# Patient Record
Sex: FEMALE | Race: BLACK OR AFRICAN AMERICAN
[De-identification: names, ages, dates, MRNs, and addresses within clinical notes are randomized per-mention and may not be internally consistent; named-entity substitution may affect disease eponyms.]

---

## 2020-08-13 ENCOUNTER — HOSPITAL ENCOUNTER (INPATIENT)
Dept: HOSPITAL 35 - ER | Age: 65
LOS: 5 days | Discharge: HOME | DRG: 871 | End: 2020-08-18
Attending: INTERNAL MEDICINE | Admitting: INTERNAL MEDICINE
Payer: COMMERCIAL

## 2020-08-13 VITALS — DIASTOLIC BLOOD PRESSURE: 122 MMHG | SYSTOLIC BLOOD PRESSURE: 236 MMHG

## 2020-08-13 VITALS — BODY MASS INDEX: 27.27 KG/M2 | HEIGHT: 65.98 IN | WEIGHT: 169.7 LBS

## 2020-08-13 DIAGNOSIS — E11.10: ICD-10-CM

## 2020-08-13 DIAGNOSIS — Z79.899: ICD-10-CM

## 2020-08-13 DIAGNOSIS — Z20.828: ICD-10-CM

## 2020-08-13 DIAGNOSIS — E83.42: ICD-10-CM

## 2020-08-13 DIAGNOSIS — E78.00: ICD-10-CM

## 2020-08-13 DIAGNOSIS — A41.9: Primary | ICD-10-CM

## 2020-08-13 DIAGNOSIS — N17.9: ICD-10-CM

## 2020-08-13 DIAGNOSIS — E87.0: ICD-10-CM

## 2020-08-13 DIAGNOSIS — E87.6: ICD-10-CM

## 2020-08-13 DIAGNOSIS — R65.20: ICD-10-CM

## 2020-08-13 DIAGNOSIS — G93.41: ICD-10-CM

## 2020-08-13 DIAGNOSIS — E78.5: ICD-10-CM

## 2020-08-13 DIAGNOSIS — I16.0: ICD-10-CM

## 2020-08-13 LAB
ALBUMIN SERPL-MCNC: 3.3 G/DL (ref 3.4–5)
ALBUMIN SERPL-MCNC: 3.9 G/DL (ref 3.4–5)
ALT SERPL-CCNC: 30 U/L (ref 30–65)
ANION GAP SERPL CALC-SCNC: 13 MMOL/L (ref 7–16)
ANION GAP SERPL CALC-SCNC: 24 MMOL/L (ref 7–16)
AST SERPL-CCNC: 28 U/L (ref 15–37)
BACTERIA-REFLEX: (no result) /HPF
BASOPHILS NFR BLD AUTO: 0.5 % (ref 0–2)
BASOPHILS NFR BLD AUTO: 0.6 % (ref 0–2)
BE(VIVO): -9 MMOL/L
BILIRUB SERPL-MCNC: 0.7 MG/DL (ref 0.2–1)
BILIRUB UR-MCNC: NEGATIVE MG/DL
BUN SERPL-MCNC: 29 MG/DL (ref 7–18)
BUN SERPL-MCNC: 35 MG/DL (ref 7–18)
CALCIUM SERPL-MCNC: 8.2 MG/DL (ref 8.5–10.1)
CALCIUM SERPL-MCNC: 8.9 MG/DL (ref 8.5–10.1)
CHLORIDE SERPL-SCNC: 103 MMOL/L (ref 98–107)
CHLORIDE SERPL-SCNC: 111 MMOL/L (ref 98–107)
CHOLEST SERPL-MCNC: 277 MG/DL (ref ?–200)
CLARITY CSF: CLEAR
CO2 SERPL-SCNC: 15 MMOL/L (ref 21–32)
CO2 SERPL-SCNC: 24 MMOL/L (ref 21–32)
COLOR CSF: COLORLESS
COLOR UR: YELLOW
CREAT SERPL-MCNC: 1.9 MG/DL (ref 0.6–1)
CREAT SERPL-MCNC: 2.1 MG/DL (ref 0.6–1)
CSF RBC: 233 /MM3
CSF WBC: 5 /MM3 (ref 0–10)
EOSINOPHIL NFR BLD: 0 % (ref 0–3)
EOSINOPHIL NFR BLD: 0 % (ref 0–3)
ERYTHROCYTE [DISTWIDTH] IN BLOOD BY AUTOMATED COUNT: 14 % (ref 10.5–14.5)
ERYTHROCYTE [DISTWIDTH] IN BLOOD BY AUTOMATED COUNT: 14.2 % (ref 10.5–14.5)
GAS PNL BLDV: 44.4 MMHG (ref 35–45)
GLUCOSE CSF-MCNC: 299 MG/DL (ref 40–70)
GLUCOSE SERPL-MCNC: 258 MG/DL (ref 74–106)
GLUCOSE SERPL-MCNC: 504 MG/DL (ref 74–106)
GRANULOCYTES NFR BLD MANUAL: 86 % (ref 36–66)
GRANULOCYTES NFR BLD MANUAL: 88.3 % (ref 36–66)
HCO3 BLD-SCNC: 15.3 MMOL/L (ref 22–26)
HCT VFR BLD CALC: 39.7 % (ref 37–47)
HCT VFR BLD CALC: 42.8 % (ref 37–47)
HDLC SERPL-MCNC: 43 MG/DL (ref 40–?)
HGB BLD-MCNC: 13.5 GM/DL (ref 12–15)
HGB BLD-MCNC: 14.3 GM/DL (ref 12–15)
KETONES UR STRIP-MCNC: (no result) MG/DL
LDLC SERPL-MCNC: 208 MG/DL (ref ?–100)
LYMPHOCYTES NFR BLD AUTO: 10.1 % (ref 24–44)
LYMPHOCYTES NFR BLD AUTO: 8.1 % (ref 24–44)
MAGNESIUM SERPL-MCNC: 1.6 MG/DL (ref 1.8–2.4)
MAGNESIUM SERPL-MCNC: 1.8 MG/DL (ref 1.8–2.4)
MCH RBC QN AUTO: 29.4 PG (ref 26–34)
MCH RBC QN AUTO: 29.6 PG (ref 26–34)
MCHC RBC AUTO-ENTMCNC: 33.4 G/DL (ref 28–37)
MCHC RBC AUTO-ENTMCNC: 34 G/DL (ref 28–37)
MCV RBC: 87.1 FL (ref 80–100)
MCV RBC: 88 FL (ref 80–100)
MONOCYTES NFR BLD: 3.1 % (ref 1–8)
MONOCYTES NFR BLD: 3.3 % (ref 1–8)
NEUTROPHILS # BLD: 6 THOU/UL (ref 1.4–8.2)
NEUTROPHILS # BLD: 8.6 THOU/UL (ref 1.4–8.2)
PCO2 BLDV: 29 MMHG (ref 41–51)
PHOSPHATE SERPL-MCNC: 0.9 MG/DL (ref 2.5–4.9)
PLATELET # BLD: 290 THOU/UL (ref 150–400)
PLATELET # BLD: 322 THOU/UL (ref 150–400)
POTASSIUM SERPL-SCNC: 3.2 MMOL/L (ref 3.5–5.1)
POTASSIUM SERPL-SCNC: 4.8 MMOL/L (ref 3.5–5.1)
PROT SERPL-MCNC: 9.1 G/DL (ref 6.4–8.2)
RBC # BLD AUTO: 4.55 MIL/UL (ref 4.2–5)
RBC # BLD AUTO: 4.86 MIL/UL (ref 4.2–5)
RBC # UR STRIP: (no result) /UL
RBC #/AREA URNS HPF: (no result) /HPF (ref 0–2)
SALICYLATES SERPL-MCNC: < 2.8 MG/DL (ref 2.8–20)
SODIUM SERPL-SCNC: 142 MMOL/L (ref 136–145)
SODIUM SERPL-SCNC: 148 MMOL/L (ref 136–145)
SP GR UR STRIP: 1.02 (ref 1–1.03)
SPECIMEN VOL 24H UR: 11.5 ML
SQUAMOUS: (no result) /LPF (ref 0–3)
TC:HDL: 6.4 RATIO
TRIGL SERPL-MCNC: 134 MG/DL (ref ?–150)
TROPONIN I SERPL-MCNC: 0.07 NG/ML (ref ?–0.06)
URINE CLARITY: (no result)
URINE GLUCOSE-RANDOM*: (no result)
URINE LEUKOCYTES-REFLEX: NEGATIVE
URINE NITRITE-REFLEX: NEGATIVE
URINE PROTEIN (DIPSTICK): (no result)
UROBILINOGEN UR STRIP-ACNC: 0.2 E.U./DL (ref 0.2–1)
VLDLC SERPL CALC-MCNC: 27 MG/DL (ref ?–40)
WBC # BLD AUTO: 7 THOU/UL (ref 4–11)
WBC # BLD AUTO: 9.7 THOU/UL (ref 4–11)

## 2020-08-13 PROCEDURE — 10081 I&D PILONIDAL CYST COMP: CPT

## 2020-08-13 PROCEDURE — 10078: CPT

## 2020-08-13 PROCEDURE — 02HV33Z INSERTION OF INFUSION DEVICE INTO SUPERIOR VENA CAVA, PERCUTANEOUS APPROACH: ICD-10-PCS | Performed by: INTERNAL MEDICINE

## 2020-08-13 PROCEDURE — 27000 TENOTOMY ADDUCTOR HIP PERQ: CPT

## 2020-08-13 NOTE — NUR
PICC TO BE PLACED PER ORDERED BY
DR. MCLEOD. PATIENT IS VERY RESTLESS. EMT ASSISTED
PLACING LINE. MEDICAL NECESSITY PER DR. MCLEOD. TIME OUT COMPLETED WITH MATTHIEU MILLER RN. TRIPLE LUMEN PICC PLACED TO RUE BASILIC VEIN. ONE STICK AND NO
COMPLICATIONS. PATIENT TOLERATED WELL. PICC LENGTH =37 CM. EXT =3.5CM.
V.O.=20%/ TO OF PICC VERIFIED DISTAL SVC PER CHEST XRAY. MATTHIEU MILLER RN NOTIFIED
OKAY TO USE PICC.

## 2020-08-14 VITALS — SYSTOLIC BLOOD PRESSURE: 153 MMHG | DIASTOLIC BLOOD PRESSURE: 90 MMHG

## 2020-08-14 VITALS — SYSTOLIC BLOOD PRESSURE: 174 MMHG | DIASTOLIC BLOOD PRESSURE: 85 MMHG

## 2020-08-14 VITALS — SYSTOLIC BLOOD PRESSURE: 170 MMHG | DIASTOLIC BLOOD PRESSURE: 79 MMHG

## 2020-08-14 VITALS — SYSTOLIC BLOOD PRESSURE: 156 MMHG | DIASTOLIC BLOOD PRESSURE: 85 MMHG

## 2020-08-14 VITALS — DIASTOLIC BLOOD PRESSURE: 76 MMHG | SYSTOLIC BLOOD PRESSURE: 167 MMHG

## 2020-08-14 VITALS — DIASTOLIC BLOOD PRESSURE: 101 MMHG | SYSTOLIC BLOOD PRESSURE: 207 MMHG

## 2020-08-14 VITALS — SYSTOLIC BLOOD PRESSURE: 168 MMHG | DIASTOLIC BLOOD PRESSURE: 83 MMHG

## 2020-08-14 VITALS — DIASTOLIC BLOOD PRESSURE: 82 MMHG | SYSTOLIC BLOOD PRESSURE: 158 MMHG

## 2020-08-14 VITALS — DIASTOLIC BLOOD PRESSURE: 93 MMHG | SYSTOLIC BLOOD PRESSURE: 176 MMHG

## 2020-08-14 VITALS — SYSTOLIC BLOOD PRESSURE: 164 MMHG | DIASTOLIC BLOOD PRESSURE: 79 MMHG

## 2020-08-14 VITALS — DIASTOLIC BLOOD PRESSURE: 73 MMHG | SYSTOLIC BLOOD PRESSURE: 176 MMHG

## 2020-08-14 VITALS — SYSTOLIC BLOOD PRESSURE: 160 MMHG | DIASTOLIC BLOOD PRESSURE: 75 MMHG

## 2020-08-14 VITALS — DIASTOLIC BLOOD PRESSURE: 83 MMHG | SYSTOLIC BLOOD PRESSURE: 140 MMHG

## 2020-08-14 VITALS — SYSTOLIC BLOOD PRESSURE: 168 MMHG | DIASTOLIC BLOOD PRESSURE: 81 MMHG

## 2020-08-14 VITALS — DIASTOLIC BLOOD PRESSURE: 93 MMHG | SYSTOLIC BLOOD PRESSURE: 182 MMHG

## 2020-08-14 VITALS — DIASTOLIC BLOOD PRESSURE: 84 MMHG | SYSTOLIC BLOOD PRESSURE: 166 MMHG

## 2020-08-14 VITALS — SYSTOLIC BLOOD PRESSURE: 170 MMHG | DIASTOLIC BLOOD PRESSURE: 77 MMHG

## 2020-08-14 VITALS — SYSTOLIC BLOOD PRESSURE: 162 MMHG | DIASTOLIC BLOOD PRESSURE: 82 MMHG

## 2020-08-14 VITALS — SYSTOLIC BLOOD PRESSURE: 154 MMHG | DIASTOLIC BLOOD PRESSURE: 76 MMHG

## 2020-08-14 VITALS — SYSTOLIC BLOOD PRESSURE: 175 MMHG | DIASTOLIC BLOOD PRESSURE: 86 MMHG

## 2020-08-14 VITALS — SYSTOLIC BLOOD PRESSURE: 191 MMHG | DIASTOLIC BLOOD PRESSURE: 87 MMHG

## 2020-08-14 VITALS — SYSTOLIC BLOOD PRESSURE: 208 MMHG | DIASTOLIC BLOOD PRESSURE: 106 MMHG

## 2020-08-14 VITALS — DIASTOLIC BLOOD PRESSURE: 85 MMHG | SYSTOLIC BLOOD PRESSURE: 172 MMHG

## 2020-08-14 VITALS — DIASTOLIC BLOOD PRESSURE: 90 MMHG | SYSTOLIC BLOOD PRESSURE: 185 MMHG

## 2020-08-14 VITALS — SYSTOLIC BLOOD PRESSURE: 161 MMHG | DIASTOLIC BLOOD PRESSURE: 81 MMHG

## 2020-08-14 VITALS — DIASTOLIC BLOOD PRESSURE: 88 MMHG | SYSTOLIC BLOOD PRESSURE: 170 MMHG

## 2020-08-14 VITALS — DIASTOLIC BLOOD PRESSURE: 96 MMHG | SYSTOLIC BLOOD PRESSURE: 189 MMHG

## 2020-08-14 VITALS — SYSTOLIC BLOOD PRESSURE: 161 MMHG | DIASTOLIC BLOOD PRESSURE: 85 MMHG

## 2020-08-14 VITALS — DIASTOLIC BLOOD PRESSURE: 107 MMHG | SYSTOLIC BLOOD PRESSURE: 217 MMHG

## 2020-08-14 VITALS — SYSTOLIC BLOOD PRESSURE: 152 MMHG | DIASTOLIC BLOOD PRESSURE: 92 MMHG

## 2020-08-14 VITALS — DIASTOLIC BLOOD PRESSURE: 100 MMHG | SYSTOLIC BLOOD PRESSURE: 215 MMHG

## 2020-08-14 VITALS — DIASTOLIC BLOOD PRESSURE: 73 MMHG | SYSTOLIC BLOOD PRESSURE: 168 MMHG

## 2020-08-14 VITALS — SYSTOLIC BLOOD PRESSURE: 171 MMHG | DIASTOLIC BLOOD PRESSURE: 85 MMHG

## 2020-08-14 VITALS — SYSTOLIC BLOOD PRESSURE: 175 MMHG | DIASTOLIC BLOOD PRESSURE: 84 MMHG

## 2020-08-14 VITALS — DIASTOLIC BLOOD PRESSURE: 76 MMHG | SYSTOLIC BLOOD PRESSURE: 160 MMHG

## 2020-08-14 VITALS — DIASTOLIC BLOOD PRESSURE: 92 MMHG | SYSTOLIC BLOOD PRESSURE: 166 MMHG

## 2020-08-14 VITALS — SYSTOLIC BLOOD PRESSURE: 183 MMHG | DIASTOLIC BLOOD PRESSURE: 88 MMHG

## 2020-08-14 VITALS — SYSTOLIC BLOOD PRESSURE: 195 MMHG | DIASTOLIC BLOOD PRESSURE: 98 MMHG

## 2020-08-14 VITALS — DIASTOLIC BLOOD PRESSURE: 76 MMHG | SYSTOLIC BLOOD PRESSURE: 159 MMHG

## 2020-08-14 VITALS — SYSTOLIC BLOOD PRESSURE: 168 MMHG | DIASTOLIC BLOOD PRESSURE: 79 MMHG

## 2020-08-14 VITALS — DIASTOLIC BLOOD PRESSURE: 81 MMHG | SYSTOLIC BLOOD PRESSURE: 168 MMHG

## 2020-08-14 VITALS — DIASTOLIC BLOOD PRESSURE: 92 MMHG | SYSTOLIC BLOOD PRESSURE: 162 MMHG

## 2020-08-14 VITALS — SYSTOLIC BLOOD PRESSURE: 145 MMHG | DIASTOLIC BLOOD PRESSURE: 75 MMHG

## 2020-08-14 VITALS — DIASTOLIC BLOOD PRESSURE: 94 MMHG | SYSTOLIC BLOOD PRESSURE: 175 MMHG

## 2020-08-14 VITALS — SYSTOLIC BLOOD PRESSURE: 180 MMHG | DIASTOLIC BLOOD PRESSURE: 88 MMHG

## 2020-08-14 VITALS — SYSTOLIC BLOOD PRESSURE: 149 MMHG | DIASTOLIC BLOOD PRESSURE: 75 MMHG

## 2020-08-14 LAB
ALBUMIN SERPL-MCNC: 3 G/DL (ref 3.4–5)
ALBUMIN SERPL-MCNC: 3 G/DL (ref 3.4–5)
ANION GAP SERPL CALC-SCNC: 11 MMOL/L (ref 7–16)
ANION GAP SERPL CALC-SCNC: 14 MMOL/L (ref 7–16)
ANION GAP SERPL CALC-SCNC: 16 MMOL/L (ref 7–16)
BUN SERPL-MCNC: 19 MG/DL (ref 7–18)
BUN SERPL-MCNC: 25 MG/DL (ref 7–18)
BUN SERPL-MCNC: 32 MG/DL (ref 7–18)
CALCIUM SERPL-MCNC: 7.7 MG/DL (ref 8.5–10.1)
CALCIUM SERPL-MCNC: 7.8 MG/DL (ref 8.5–10.1)
CALCIUM SERPL-MCNC: 7.8 MG/DL (ref 8.5–10.1)
CHLORIDE SERPL-SCNC: 111 MMOL/L (ref 98–107)
CHLORIDE SERPL-SCNC: 112 MMOL/L (ref 98–107)
CHLORIDE SERPL-SCNC: 112 MMOL/L (ref 98–107)
CO2 SERPL-SCNC: 19 MMOL/L (ref 21–32)
CO2 SERPL-SCNC: 20 MMOL/L (ref 21–32)
CO2 SERPL-SCNC: 22 MMOL/L (ref 21–32)
CREAT SERPL-MCNC: 1.3 MG/DL (ref 0.6–1)
CREAT SERPL-MCNC: 1.4 MG/DL (ref 0.6–1)
CREAT SERPL-MCNC: 1.9 MG/DL (ref 0.6–1)
FOLATE SERPL-MCNC: 18.7 NG/ML (ref 8.6–58.9)
GLUCOSE SERPL-MCNC: 209 MG/DL (ref 74–106)
GLUCOSE SERPL-MCNC: 269 MG/DL (ref 74–106)
GLUCOSE SERPL-MCNC: 382 MG/DL (ref 74–106)
MAGNESIUM SERPL-MCNC: 1.4 MG/DL (ref 1.8–2.4)
MAGNESIUM SERPL-MCNC: 1.6 MG/DL (ref 1.8–2.4)
MAGNESIUM SERPL-MCNC: 1.6 MG/DL (ref 1.8–2.4)
PHOSPHATE SERPL-MCNC: 1 MG/DL (ref 2.5–4.9)
PHOSPHATE SERPL-MCNC: 3.3 MG/DL (ref 2.5–4.9)
POTASSIUM SERPL-SCNC: 3.3 MMOL/L (ref 3.5–5.1)
POTASSIUM SERPL-SCNC: 3.6 MMOL/L (ref 3.5–5.1)
POTASSIUM SERPL-SCNC: 3.9 MMOL/L (ref 3.5–5.1)
SODIUM SERPL-SCNC: 145 MMOL/L (ref 136–145)
SODIUM SERPL-SCNC: 146 MMOL/L (ref 136–145)
SODIUM SERPL-SCNC: 146 MMOL/L (ref 136–145)
TSH SERPL-ACNC: 0.72 UIU/ML (ref 0.36–3.74)
VIT B12 SERPL-MCNC: 867 PG/ML (ref 193–986)

## 2020-08-14 NOTE — NUR
PT WORKING TOWARDS GOALS.  VSS.  BLOOD SUGAR UNDER 200 GAP CLOSINGS.
FOLLOWING COMMANDS.  HTN.  HYDRALAZINE GIVEN X 2

## 2020-08-14 NOTE — NUR
PT  UPDATED.  EMOTIONALY SUPPORT GIVEN.  CARD AND DR. MURILLO HERE.  PT
HTN.  WILL TRY HYDRALZINE.  PT WAKING UP AND FOLLOWING COMMAND.  DKA PROTOLCOL
IN PLACE.

## 2020-08-14 NOTE — NUR
ADMIT FROM ER DKA.  ORIENTED TO ROOM.  HOURLY BLOOD SUGARS.  HTN.  MD AWARE.
HISTORY AND PHYSICAL DONE.

## 2020-08-14 NOTE — EKG
[facility]
[address]
[place]   [address]                     ELECTROCARDIOGRAM REPORT      
_______________________________________________________________________________
 
Name:       DAKOTA SCHNEIDERLL NAVJOT              Room #:         170-9       ADM IN  
M.R.#:      [id number]                       Account #:      [id number]  
Admission:  20    Attend Phys:    Joseph Llanos MD   
Discharge:              Date of Birth:  [birth year]  
                                                          Report #: [id number]
                                                                    [id number]
_______________________________________________________________________________
THIS REPORT FOR:  
 
cc:  HOMAR Chavez family physician/PCP 
     HOMAR - Kathy family physician/PCP 
     Roman Milian MD                                            ~
THIS REPORT FOR:   //name//                          
 
                         [facility] ED
                                       
Test Date:    2020               Test Time:    14:21:01
Pat Name:     IBIS SCHNEIDER           Department:   
Patient ID:   [id number]            Room:         [facility]
Gender:       F                        Technician:   del
:          1955               Requested By: Kelly Ron
Order Number: [id number] MD:   Roman Milian
                                 Measurements
Intervals                              Axis          
Rate:         107                      P:            53
PA:           153                      QRS:          6
QRSD:         80                       T:            139
QT:           334                                    
QTc:          446                                    
                           Interpretive Statements
Sinus tachycardia
LVH with secondary repolarization abnormality
Anterior ST elevation, probably due to LVH
Compared to ECG 2020 13:52:31
No significant changes
Electronically Signed On 2020 8:11:40 CDT by Roman Milian
[email or web address]
 
 
 
 
 
 
 
 
 
 
 
 
 
 
  <ELECTRONICALLY SIGNED>
   By: Roman Milian MD        
  20     0811
D: 20 1421                           _____________________________________
T: 20 142                           Roman Milian MD          /EPI

## 2020-08-14 NOTE — NUR
chart review. unable to visit with bedside nurse at this time. cm unable to
visit with marques. tried calling number, not a working number. tried calling
friend [facility], [phone], left message and tried daughter listed
and no answer. marques came in with ams, high bp and high bs. will cont
following as needed for dc needs. covid pending.

## 2020-08-14 NOTE — EKG
[facility]
[place]   [address]                     ELECTROCARDIOGRAM REPORT      
_______________________________________________________________________________
 
Name:       WYATTIBIS NAVJOT              Room #:         170-9       ADM IN  
M.R.#:      [id number]                       Account #:      [id number]  
Admission:  20    Attend Phys:    Joseph Llanos MD   
Discharge:              Date of Birth:  [birth year]  
                                                          Report #: [id number]
                                                                    [id number]
_______________________________________________________________________________
THIS REPORT FOR:  
 
cc:  HOMAR - Kathy family physician/PCP 
     HOMAR - Kathy family physician/PCP 
     Roman Milian MD                                            ~
THIS REPORT FOR:   //name//                          
 
                         [facility] ED
                                       
Test Date:    2020               Test Time:    13:52:31
Pat Name:     IBIS SCHNEIDER           Department:   
Patient ID:   [id number]            Room:         [facility]
Gender:       F                        Technician:   del
:          1955               Requested By: Kelly Ron
Order Number: [id number] MD:   Roman Milian
                                 Measurements
Intervals                              Axis          
Rate:         109                      P:            46
WA:           157                      QRS:          0
QRSD:         80                       T:            121
QT:           334                                    
QTc:          450                                    
                           Interpretive Statements
Sinus tachycardia
LVH with secondary repolarization abnormality
Anterior ST elevation, probably due to LVH
Baseline wander in lead(s) V2
No previous ECG available for comparison
Electronically Signed On 2020 8:12:23 CDT by Roman Milian
[email or web address]
 
 
 
 
 
 
 
 
 
 
 
 
 
 
  <ELECTRONICALLY SIGNED>
   By: Roman Milian MD        
  20
D: 20 135                           _____________________________________
T: 20                           Roman Milian MD          /EPI

## 2020-08-15 VITALS — DIASTOLIC BLOOD PRESSURE: 84 MMHG | SYSTOLIC BLOOD PRESSURE: 183 MMHG

## 2020-08-15 VITALS — SYSTOLIC BLOOD PRESSURE: 183 MMHG | DIASTOLIC BLOOD PRESSURE: 84 MMHG

## 2020-08-15 LAB
ALBUMIN SERPL-MCNC: 2.9 G/DL (ref 3.4–5)
ALT SERPL-CCNC: 27 U/L (ref 30–65)
ANION GAP SERPL CALC-SCNC: 16 MMOL/L (ref 7–16)
AST SERPL-CCNC: 25 U/L (ref 15–37)
BASOPHILS NFR BLD AUTO: 0.4 % (ref 0–2)
BILIRUB SERPL-MCNC: 0.5 MG/DL (ref 0.2–1)
BUN SERPL-MCNC: 15 MG/DL (ref 7–18)
CALCIUM SERPL-MCNC: 7.7 MG/DL (ref 8.5–10.1)
CHLORIDE SERPL-SCNC: 106 MMOL/L (ref 98–107)
CO2 SERPL-SCNC: 19 MMOL/L (ref 21–32)
CREAT SERPL-MCNC: 1.3 MG/DL (ref 0.6–1)
EOSINOPHIL NFR BLD: 0.3 % (ref 0–3)
ERYTHROCYTE [DISTWIDTH] IN BLOOD BY AUTOMATED COUNT: 14.2 % (ref 10.5–14.5)
EST. AVERAGE GLUCOSE BLD GHB EST-MCNC: < 74 MG/DL
GLUCOSE SERPL-MCNC: 275 MG/DL (ref 74–106)
GLYCOHEMOGLOBIN (HGB A1C): < 4.2 % (ref 4.8–5.6)
GRANULOCYTES NFR BLD MANUAL: 74.8 % (ref 36–66)
HCT VFR BLD CALC: 39.3 % (ref 37–47)
HGB BLD-MCNC: 13 GM/DL (ref 12–15)
LYMPHOCYTES NFR BLD AUTO: 18.2 % (ref 24–44)
MCH RBC QN AUTO: 29.3 PG (ref 26–34)
MCHC RBC AUTO-ENTMCNC: 33.2 G/DL (ref 28–37)
MCV RBC: 88.3 FL (ref 80–100)
MONOCYTES NFR BLD: 6.3 % (ref 1–8)
NEUTROPHILS # BLD: 7.4 THOU/UL (ref 1.4–8.2)
PLATELET # BLD: 220 THOU/UL (ref 150–400)
POTASSIUM SERPL-SCNC: 3.4 MMOL/L (ref 3.5–5.1)
PROT SERPL-MCNC: 7 G/DL (ref 6.4–8.2)
RBC # BLD AUTO: 4.45 MIL/UL (ref 4.2–5)
SODIUM SERPL-SCNC: 141 MMOL/L (ref 136–145)
WBC # BLD AUTO: 9.9 THOU/UL (ref 4–11)

## 2020-08-15 NOTE — HC
[facility]
[address]
[place]   [address]                     CONSULTATION                  
_______________________________________________________________________________
 
Name:       IBIS SCHNEIDER              Room #:         [facility] [place]  
M.R.#:      [id number]                       Account #:      [id number]  
Admission:  08/13/20    Attend Phys:    Joseph Llanos MD   
Discharge:              Date of Birth:  [birth year]  
                                                          Report #: [id number]
                                                                    [id number]   
_______________________________________________________________________________
THIS REPORT FOR:  
 
cc:  HOMAR Chavez family physician/PCP 
     HOMAR - Kathy family physician/PCP                                        
     Joanna Flores MD                                         ~
CC: Joseph GRAF physician/PCP
 
DATE OF SERVICE:  08/14/2020
 
 
ENDOCRINE CONSULTATION NOTE
 
CONSULTING PHYSICIAN:  Dr. Llanos.
 
REASON FOR CONSULTATION:  DKA, type 2 diabetes mellitus.
 
HISTORY OF PRESENT ILLNESS:  This is a 64-year-old female patient who presented
to the ER with the issue of altered mental status.  The patient was brought by
family, as she was noted to have behavioral changes and to become combative. 
She was febrile with a temperature of 102.  It appears that most recently the
patient has not been taking any medications.  On arrival, the patient was found
to be hyperglycemic and in diabetic ketoacidosis.  She also was found to have
lactic acidosis and sepsis, and was admitted for further care and monitoring. 
The patient has been somnolent, not informative, unable to communicate well.
 
REVIEW OF SYSTEMS:
CONSTITUTIONAL:  Fatigue, tiredness, mental status changes as noted above in
addition to reports of fever.
HEENT:  Negative for sore throat, sinus pain, ear drainage.
PULMONARY:  Negative for shortness of breath, cough or hemoptysis.
CARDIAC:  Negative for chest pain, palpitations, syncope or presyncope.
GASTROINTESTINAL:  Negative for abdominal pain, nausea, vomiting or change in
her bowel movement frequency.
NEUROLOGY:  Negative for seizure activity, severe frequent headaches or loss of
consciousness.
DERMATOLOGY:  Negative for rash, ulceration, discoloration or other major
abnormalities.
 
Otherwise, review of systems was noncontributory other than those mentioned in
HPI.
 
PAST MEDICAL HISTORY:  Hypertension and hyperlipidemia.
 
OUTPATIENT MEDICATIONS:  Noted for lisinopril 40 mg daily, Bystolic 10 mg daily,
amlodipine 10 mg daily, triamterene/hydrochlorothiazide 37.5/25 mg daily.
 
 
 
[facility]
[address] [place]   [address]                     CONSULTATION                  
_______________________________________________________________________________
 
Name:       IBIS SCHNEIDER              Room #:         238-P       [facility] [place]  
..#:      [id number]                       Account #:      [id number]  
Admission:  08/13/20    Attend Phys:    Joseph Llanos MD   
Discharge:              Date of Birth:  [birth year]  
                                                          Report #: [id number]
                                                                    [id number]   
_______________________________________________________________________________
 
ALLERGIES:  No known drug allergies.
 
FAMILY HISTORY:  Noncontributory.
 
SOCIAL HISTORY:  No documentation of tobacco or alcohol use.  She is .
 
PHYSICAL EXAMINATION:
GENERAL:  The patient is somnolent, appears lethargic, not arousable by verbal
stimuli.
VITAL SIGNS:  Blood pressure is 168/83 mmHg.  This was as high as 232/125 mmHg,
heart rate is 74 beats per minute, respirations 16 per minute, temperature 37.1
degrees Celsius.
CONSTITUTIONAL:  The patient is lying in bed, lethargic, not responsive and
somnolent.
HEENT:  Anicteric sclerae.
NECK:  Supple.  No thyromegaly.
CHEST:  Noted for limited air entry bilaterally with scattered rales.
HEART:  Regular rate and rhythm without murmurs or gallops.
ABDOMEN:  Soft, lax.  No guarding.  Active bowel sounds.
EXTREMITIES:  Lower extremity exam, negative for ankle edema, skin breaks or
ulcerations.
NEUROLOGIC:  Lethargic, somnolent, not cooperative.
PSYCHIATRIC:  Somnolent, not cooperative.
 
LABORATORY RESULTS:  Blood glucose values were examined and on arrival they were
over 500 and has since been consistently between 222 and 386 mg/dL.  Sodium 146,
potassium 3.3, chloride 112, CO2 of 20, anion gap 14, BUN 25, creatinine 1.4,
glucose 269, AST 28, calcium 7.8, phosphorus 1.0, magnesium 1.6, alkaline
phosphatase 112, total protein 9.1, albumin 3.0.  EGFR 46.  Ammonia 35.  Lactic
acid 1.8.  On arrival, it was 3.5.  Troponin 0.15.  Total cholesterol 277,
triglycerides 134, HDL 43, .  Negative alcohol level, CRP 11.3.  White
blood count 7.0, hemoglobin 13.5, hematocrit 39.7, platelets 290.  COVID testing
has been negative so far.  TSH 0.722.  Hemoglobin A1c has been ordered  and that
is pending.
 
ASSESSMENT AND PLAN:
1.  Diabetic ketoacidosis.  The patient presented with clinical and metabolic
changes that are suggestive of diabetic ketoacidosis including an anion gap of
24 on presentation.  The patient was appropriately managed with intravenous
insulin therapy and intravenous fluids as per the [facility]
protocol, especially given her inability to cooperate with p.o. intake.  The
patient is slowly responding to these measures and has had her blood glucose
descend into the low 200 mg/dL range.  Given her continued clinical instability,
somnolence, and still not ideal blood glucose control, I would like to continue
with IV insulin therapy and IV fluid therapy until these issues have
 
 
 
[facility]
[address]
[place]   [address]                     CONSULTATION                  
_______________________________________________________________________________
 
Name:       IBIS SCHNEIDER              Room #:         238-P       [facility] [place]  
M.R.#:      [id number]                       Account #:      [id number]  
Admission:  08/13/20    Attend Phys:    Joseph Llanos MD   
Discharge:              Date of Birth:  [birth year]  
                                                          Report #: [id number]
                                                                    [id number]   
_______________________________________________________________________________
demonstrated more stability.  I am hopeful to be able to switch her off of IV
insulin therapy tomorrow morning.  Until then, the patient is to undergo blood
glucose monitoring as per the intravenous insulin infusion protocol at [facility].
2.  Type 2 diabetes mellitus.  With her diabetic ketoacidosis presentation, it
is strongly assumed that she has a diabetic background.  However, little is
known about that issue historically and her outpatient regimen is not available
to me at this point in time.  I will check a hemoglobin A1c to gain a better
understanding of her glycemic outlook over the past few months.  Once the
patient has been able to come off intravenous insulin, antidiabetic therapy will
be devised accordingly and according to her needs at this time.
3.  Hypertension.  The patient is known to have hypertension and is maintained
on multiple agents.  Her blood pressure control is stable for the time being,
she is to continue with the same.
4.  Hyperlipidemia.  Severe, and is in need of statin therapy.  However, this
can be initiated when the patient had proven to be more clinically stable first.
5.  Sepsis.  The patient is being investigated and has been so far COVID
negative; she is currently on vancomycin and Rocephin.
 
I certainly appreciate this consultation by Dr. Llanos.
 
 
 
 
 
 
 
 
 
 
 
 
 
 
 
 
 
 
 
 
 
 
 
 
  <ELECTRONICALLY SIGNED>
   By: Joanna Flores MD         
  08/15/20     1059
D: 08/14/20 1327                           _____________________________________
T: 08/14/20 1405                           Joanna Flores MD           /nt

## 2020-08-15 NOTE — NUR
PT IS ALERT AND ORIENTED X4. LUNGS ARE CLEAR ON ROOM AIR. COMPLAINTS OF
HEADACHE AND SOME ELEVATED BLOOD PRESSURES. MANAGED WITH TYELNOL AND MEDS
GIVEN FOR BLOOD PRESSURE. RELIEF OF HEADACHE AFTER TYENOL. ON INSULIN DRIP AND
MANAGEMENT. WILL CONTINUE TO MANANGE DRIP AND BLOOD PRESSURES AT THIS TIME IN
ISOLATION IN ENHANCED PRECAUTIONS FOR CORNA IN THE ICU AT THIS TIME FOR
HOSPITALIZATION

## 2020-08-15 NOTE — NUR
ASSUMED CARE @ 0700 8/15/20, PT ASSESSMENTS AND VSS COMPLETE PER ICU PROTOCOL.
PT ENCOUNTERED ON THE INSULIN GTT. DR MURILLO HERE TO ROUND, NO ORDERS RECIEVED
AT THAT TIME. DR LOVETT AT BEDSIDE TO VISIT WITH PT, ORDERS RECIEVED AND
EXECUTED. LANTUS GIVEN @ 1145, WILL STOP INSULIN GTT @ 1345 PER ORDERS.
DR KING ID CALLED TO SEE IF ENHANCED PRECAUTIONS CAN BE DISCONTINUED DUE TO
ALREADY 3 NEGATIVE RESULTS, ORDER GIVE TO D'C.
ORDERS FOR TRANSFER AFTER INSULIN DRIP TURNED OFF, WILL CONT TO MONITOR.

## 2020-08-15 NOTE — EKG
[facility]
[address]
[place]   [address]                     ELECTROCARDIOGRAM REPORT      
_______________________________________________________________________________
 
Name:       WYATTIBIS MORFIN              Room #:         238-       ADM IN  
M.R.#:      [id number]                       Account #:      [id number]  
Admission:  20    Attend Phys:    Joseph Llanos MD   
Discharge:              Date of Birth:  [birth year]  
                                                          Report #: [id number]
                                                                    [id number]
_______________________________________________________________________________
THIS REPORT FOR:  
 
cc:  HOMAR - Kathy family physician/PCP 
     HOMAR - No family physician/PCP 
     Roman Milian MD                                            ~
THIS REPORT FOR:   //name//                          
 
                         [facility] ED
                                       
Test Date:    2020               Test Time:    07:15:54
Pat Name:     IBIS SCHNEIDER           Department:   
Patient ID:   [id number]            Room:         [facility]
Gender:       F                        Technician:   ELSIE
:          1955               Requested By: Kelly Ron
Order Number: [id number] MD:   Roman Milian
                                 Measurements
Intervals                              Axis          
Rate:         75                       P:            58
OK:           164                      QRS:          5
QRSD:         86                       T:            193
QT:           431                                    
QTc:          482                                    
                           Interpretive Statements
Sinus rhythm
LVH with secondary repolarization abnormality
Anterior ST elevation, probably due to LVH
Not an STEMI
Compared to ECG 2020 14:21:01
Electronically Signed On 8- 11:32:37 CDT by Roman Milian
[email or web address]
 
 
 
 
 
 
 
 
 
 
 
 
 
 
  <ELECTRONICALLY SIGNED>
   By: Roman Milian MD        
  08/15/20     1132
D: 20                           _____________________________________
T: 20                           Roman Milian MD          /EPI

## 2020-08-15 NOTE — NUR
ASSUMED CARE OF PT AT APPROX 1500 AS TRANSFER FROM ICU. INITIAL ASSESSMENT AS
CHARTED. PT ORIENTED TO ROOM AND DESIGNATED VISITOR NAME ENTERED. PT A&OX4, NO
C/O PAIN OR DISTRESS. WILL CONTINUE TO MONITOR AND FOLLOW POC.

## 2020-08-16 VITALS — SYSTOLIC BLOOD PRESSURE: 147 MMHG | DIASTOLIC BLOOD PRESSURE: 79 MMHG

## 2020-08-16 VITALS — DIASTOLIC BLOOD PRESSURE: 67 MMHG | SYSTOLIC BLOOD PRESSURE: 172 MMHG

## 2020-08-16 VITALS — SYSTOLIC BLOOD PRESSURE: 130 MMHG | DIASTOLIC BLOOD PRESSURE: 63 MMHG

## 2020-08-16 VITALS — SYSTOLIC BLOOD PRESSURE: 149 MMHG | DIASTOLIC BLOOD PRESSURE: 69 MMHG

## 2020-08-16 VITALS — DIASTOLIC BLOOD PRESSURE: 92 MMHG | SYSTOLIC BLOOD PRESSURE: 187 MMHG

## 2020-08-16 VITALS — SYSTOLIC BLOOD PRESSURE: 152 MMHG | DIASTOLIC BLOOD PRESSURE: 85 MMHG

## 2020-08-16 NOTE — NUR
ASSESSMENTS AS CHARTED, MEDS CHARTED AS GIVEN. PATIENT HAD ARRIVED FROM ICU
DURING DAY SHIFT. PUMPS WERE SET UP AND IV'S STARTED. EVENING ACCU CHECK WAS
315. SPOKE WITH VIVIANA CARTWRIGHT WHO DC'D ALL OF THE ICU AND DKA ORDERS.
ELECTROLYTES WERE ADDRESSED. PATIENT'S BP WAS ELEVATED AT START OF SHIFT
183/84. HYDRALAZINE GIVEN. C/O HEADACHE TWICE DURING SHIFT. GAVE TYLENOL.
REMOVED MARSHALL. PATIENT WAS VERY DIZZY AND UNBALANCED WHEN SHE GOT UP TO USE
THE BATHROOM. UP WITH STANDBY ASSIST AND GAITBELT.

## 2020-08-16 NOTE — NUR
AAOX4. CALM, PLEASANT. HTN NOTED. IVF DISCONTINUED. SR/ST PER TELE. VOIDING
WITHOUT DIFFICULTY SINCE MARSHALL DC'D. WILL CONTINUE TO FOLLOW CLOSELY.

## 2020-08-17 VITALS — SYSTOLIC BLOOD PRESSURE: 159 MMHG | DIASTOLIC BLOOD PRESSURE: 94 MMHG

## 2020-08-17 VITALS — DIASTOLIC BLOOD PRESSURE: 88 MMHG | SYSTOLIC BLOOD PRESSURE: 159 MMHG

## 2020-08-17 VITALS — DIASTOLIC BLOOD PRESSURE: 77 MMHG | SYSTOLIC BLOOD PRESSURE: 145 MMHG

## 2020-08-17 VITALS — DIASTOLIC BLOOD PRESSURE: 84 MMHG | SYSTOLIC BLOOD PRESSURE: 157 MMHG

## 2020-08-17 VITALS — SYSTOLIC BLOOD PRESSURE: 180 MMHG | DIASTOLIC BLOOD PRESSURE: 103 MMHG

## 2020-08-17 LAB
ANION GAP SERPL CALC-SCNC: 12 MMOL/L (ref 7–16)
BUN SERPL-MCNC: 17 MG/DL (ref 7–18)
CALCIUM SERPL-MCNC: 8.1 MG/DL (ref 8.5–10.1)
CHLORIDE SERPL-SCNC: 102 MMOL/L (ref 98–107)
CO2 SERPL-SCNC: 23 MMOL/L (ref 21–32)
CREAT SERPL-MCNC: 1.3 MG/DL (ref 0.6–1)
ERYTHROCYTE [DISTWIDTH] IN BLOOD BY AUTOMATED COUNT: 13.9 % (ref 10.5–14.5)
GLUCOSE SERPL-MCNC: 321 MG/DL (ref 74–106)
HCT VFR BLD CALC: 38.5 % (ref 37–47)
HGB BLD-MCNC: 12.9 GM/DL (ref 12–15)
MCH RBC QN AUTO: 29.3 PG (ref 26–34)
MCHC RBC AUTO-ENTMCNC: 33.5 G/DL (ref 28–37)
MCV RBC: 87.5 FL (ref 80–100)
PLATELET # BLD: 226 THOU/UL (ref 150–400)
POTASSIUM SERPL-SCNC: 3.5 MMOL/L (ref 3.5–5.1)
RBC # BLD AUTO: 4.4 MIL/UL (ref 4.2–5)
SODIUM SERPL-SCNC: 137 MMOL/L (ref 136–145)
WBC # BLD AUTO: 5.1 THOU/UL (ref 4–11)

## 2020-08-17 NOTE — NUR
ASSESSMENT AS DOCUMENTED.PT BEEN RESTING IN NO ACUTE DISTRESS.A/OX4.VSS.DENIES
NEEDS.BLOOD PRESSURE CONTROLLED WITH PRN MEDS.PT TO POSSIBLY DISCHARGE TODAY
TO HOME.

## 2020-08-17 NOTE — 2DMMODE
[facility]
[address]
[place]   [address]                   2 D/M-MODE ECHOCARDIOGRAM     
_______________________________________________________________________________
 
Name:       DAKOTA SCHNEIDERSHANNON MORFIN              Room #:         200-I       ADM IN  
.R.#:      [id number]                       Account #:      [id number]  
Admission:  08/13/20    Attend Phys:    Joseph Llanos MD   
Discharge:              Date of Birth:  [birth year]  
                                                          Report #: [id number]
                                                                    [id number]
_______________________________________________________________________________
THIS REPORT FOR:  
 
cc:  FAM - No family physician/PCP 
     FAM - No family physician/PCP 
     Juan Perez MD                                        
                                                                       ~
 
--------------- APPROVED REPORT --------------
 
 
Study performed:  08/17/2020 07:22:23
 
EXAM: Comprehensive 2D, Doppler, and color-flow 
Echocardiogram 
Patient Location: Bedside   
Room #:  200     Status:  routine
 
      BSA:         1.86
HR: 74 bpm BP:          157/84 mmHg 
Rhythm: NSR     
 
Other Information 
Study Quality: Adequate
 
Indications
Elevated troponin.
Hx: HTN, HLP.
 
2D Dimensions
RVDd:  29.52 mm  
IVSd:  15.45 (7-11mm) LVOT Diam:  18.58 (18-24mm) 
LVDd:  34.05 mm  
PWd:  15.60 (7-11mm) Ascending Ao:  29.00 (22-36mm)
LVDs:  21.85 (25-40mm) 
Aortic Root:  29.00 mm 
 
Volumes
Left Atrial Volume (Systole) 
Single Plane 4CH:  59.06 mL Single Plane 2CH:  55.97 mL
    LA ESV Index:  32.00 mL/m2
 
Aortic Valve
AoV Peak Waqar.:  2.32 m/s 
AO Peak Gr.:  21.53 mmHg LVOT Max PG:  15.66 mmHg
AO Mean Gr.:  11.48 mmHg 
AO V2 Mean:  1.60 m/s  LVOT Max V:  1.98 m/s
 
 
[facility]
[address]
[place]  [address]
Phone:  [phone]                    2 D/M-MODE ECHOCARDIOGRAM     
_______________________________________________________________________________
 
Name:            IBIS SCHNEIDER              Room #:        200-I       [facility] [place]
[facility]#:           [id number]          Account #:     [id number]  
Admission:       08/13/20         Attend Phys:   RONA Pichardo
Discharge:                  Date of Birth: [birth year]  
                         Report #:      [id number]
        [id number]
_______________________________________________________________________________
AO V2 VTI:  37.72 cm  
KIKI Vmax: 2.31 cm2  
 
Mitral Valve
    E/A Ratio:  0.7
    MV Decel. Time:  288.99 ms
MV E Max Waqar.:  0.68 m/s 
MV A Waqar.:  0.95 m/s  
MV PHT:  83.81 ms  
IVRT:  106.11 ms  
 
Pulmonary Valve
PV Peak Waqar.:  2.16 m/s PV Peak Gr.:  18.65 mmHg
 
Pulmonary Vein
P Vein S:    0.52 m/s P Vein A:  0.35 m/s
P Vein D:   0.34 m/s P Vein A Dur.:  101.5 msec
P Vein S/D Ratio:  1.53 
 
Tricuspid Valve
 RAP Estimate:  5.00 mmHg
 
Left Ventricle
The left ventricle is normal size. There is normal LV segmental wall 
motion. Moderate concentric left ventricular hypertrophy. Left 
ventricular systolic function is hyperdynamic. LVEF is 65-70%. Mild 
diastolic dysfunction is present (impaired relaxation 
pattern).
 
Right Ventricle
The right ventricle is normal size. The right ventricular systolic 
function is normal.
 
Atria
Left atrium is at the upper limits of normal. The right atrium size 
is normal.
 
Aortic Valve
Aortic valve leaflets are mildly thickened. No aortic regurgitation 
is present. There is no aortic valvular stenosis.
 
Mitral Valve
The mitral valve is normal in structure. There is no mitral valve 
regurgitation noted. No evidence of mitral valve stenosis.
 
Tricuspid Valve
 
 
[facility]
[address]
[place]  [address]
Phone:  [phone]                    2 D/M-MODE ECHOCARDIOGRAM     
_______________________________________________________________________________
 
Name:            WYATTIBIS MORFIN              Room #:        200-I       ADM IN
..#:           [id number]          Account #:     [id number]  
Admission:       08/13/20         Attend Phys:   RONA Pichardo
Discharge:                  Date of Birth: [birth year]  
                         Report #:      [id number]
        [id number]
_______________________________________________________________________________
The tricuspid valve is normal in structure. There is no tricuspid 
valve regurgitation noted. Unable to assess PA pressure.
 
Pulmonic Valve
Pulmonic valve is not well visualized. Trace pulmonic 
regurgitation.
 
Great Vessels
The aortic root is normal in size. The ascending aorta is normal in 
size. IVC is normal in size and collapses >50% with 
inspiration.
 
Pericardium
There is no pericardial effusion.
 
<Conclusion>
Moderate concentric left ventricular hypertrophy.
Aortic valve leaflets are mildly thickened.
The mitral valve is normal in structure.
The tricuspid valve is normal in structure.
There is no tricuspid valve regurgitation noted.
Unable to assess PA pressure.
Pulmonic valve is not well visualized.
Trace pulmonic regurgitation.
There is no pericardial effusion.
 
 
 
 
 
 
 
 
 
 
 
 
 
 
 
 
 
 
 
  <ELECTRONICALLY SIGNED>
   By: Juan Perez MD    
  08/17/20 0940
D: 08/17/20 0940                           _____________________________________
T: 08/17/20 0940                           Juan Perez MD      /INF

## 2020-08-17 NOTE — NUR
ASSUMED CARE AT SHIFT CHANGE, ALERT AND ORIENTED X4. DENIES ANY DICOMFORT. BP
REMAINS HIGH, PATIENT SCHEDULED FOR NUC MED STRESS TEST,SOME OF THE
MORNING MEDS ARE NOT GIVING, AND WILL GIVE MEDS AFTER STRESS TEST COMPLETED.
PATIENT ANXIOUS ABOUT DM, EDUCATIONAL MATERIAL GIVEN AND HER CONCERNS HAVE
ADDRESSED.
WILL CONTINUE WITH POC.

## 2020-08-18 VITALS — SYSTOLIC BLOOD PRESSURE: 164 MMHG | DIASTOLIC BLOOD PRESSURE: 76 MMHG

## 2020-08-18 VITALS — SYSTOLIC BLOOD PRESSURE: 169 MMHG | DIASTOLIC BLOOD PRESSURE: 80 MMHG

## 2020-08-18 VITALS — DIASTOLIC BLOOD PRESSURE: 80 MMHG | SYSTOLIC BLOOD PRESSURE: 169 MMHG

## 2020-08-18 VITALS — SYSTOLIC BLOOD PRESSURE: 145 MMHG | DIASTOLIC BLOOD PRESSURE: 76 MMHG

## 2020-08-18 VITALS — SYSTOLIC BLOOD PRESSURE: 164 MMHG | DIASTOLIC BLOOD PRESSURE: 75 MMHG

## 2020-08-18 VITALS — SYSTOLIC BLOOD PRESSURE: 144 MMHG | DIASTOLIC BLOOD PRESSURE: 75 MMHG

## 2020-08-18 VITALS — DIASTOLIC BLOOD PRESSURE: 89 MMHG | SYSTOLIC BLOOD PRESSURE: 158 MMHG

## 2020-08-18 LAB
ANION GAP SERPL CALC-SCNC: 10 MMOL/L (ref 7–16)
BUN SERPL-MCNC: 15 MG/DL (ref 7–18)
CALCIUM SERPL-MCNC: 8.4 MG/DL (ref 8.5–10.1)
CHLORIDE SERPL-SCNC: 104 MMOL/L (ref 98–107)
CO2 SERPL-SCNC: 26 MMOL/L (ref 21–32)
CREAT SERPL-MCNC: 1.1 MG/DL (ref 0.6–1)
GLUCOSE SERPL-MCNC: 180 MG/DL (ref 74–106)
POTASSIUM SERPL-SCNC: 3.3 MMOL/L (ref 3.5–5.1)
SODIUM SERPL-SCNC: 140 MMOL/L (ref 136–145)

## 2020-08-18 NOTE — NUR
ASSESSMENT AS DOCUMENTED.PT BEEN RESTING IN NO ACUTE DISTRESS.VSS.PT WAS
CONCERNED WHY SHE WAS URINATING ALOT IN THE EVENING HRS YESTERDAY,EDUCATED ON
HCTZ MEDS THAT SHE WAS GIVEN AND VOICED THE UNDERSTANDING.ANTICIPATING
DISCHARGE TODAY TO HOME.WILL CONT TO MONITOR PER POC.

## 2020-08-18 NOTE — NUR
patient to [place] today. Gave her list of PCP. She reports she has seen Dr River in past. She plans to review and possibly f/u with him

## 2021-01-12 NOTE — NUR
ASSUMED CARE AT SHIFT CHANGE, ALERT AND ORIENTED X4. BP CONTROLLED WITH
SCHEDULED MED, AND DR SHETH ADJUSTED PATIENT MEDICATION AS WELL.
PATIENT EDUCATED ON HOW TO GIVE HER SELF INSULIN, AND SHE DID
GIVE HERSELF INSULIN THIS MORNING.
DISCHARGE AND MEDICATION INSTRUCTIONS GIVEN TO PATIENT, AND WAITING FOR HER
 TO COME. Detail Level: Detailed